# Patient Record
Sex: MALE | Race: WHITE | URBAN - METROPOLITAN AREA
[De-identification: names, ages, dates, MRNs, and addresses within clinical notes are randomized per-mention and may not be internally consistent; named-entity substitution may affect disease eponyms.]

---

## 2019-09-17 ENCOUNTER — TRANSFERRED RECORDS (OUTPATIENT)
Dept: HEALTH INFORMATION MANAGEMENT | Facility: CLINIC | Age: 49
End: 2019-09-17

## 2020-06-23 ENCOUNTER — TRANSFERRED RECORDS (OUTPATIENT)
Dept: HEALTH INFORMATION MANAGEMENT | Facility: CLINIC | Age: 50
End: 2020-06-23

## 2020-07-07 ENCOUNTER — TRANSFERRED RECORDS (OUTPATIENT)
Dept: HEALTH INFORMATION MANAGEMENT | Facility: CLINIC | Age: 50
End: 2020-07-07

## 2020-11-24 ENCOUNTER — TRANSFERRED RECORDS (OUTPATIENT)
Dept: HEALTH INFORMATION MANAGEMENT | Facility: CLINIC | Age: 50
End: 2020-11-24

## 2020-11-24 LAB — PHQ9 SCORE: 0

## 2021-01-07 ENCOUNTER — TRANSFERRED RECORDS (OUTPATIENT)
Dept: HEALTH INFORMATION MANAGEMENT | Facility: CLINIC | Age: 51
End: 2021-01-07

## 2021-01-19 ENCOUNTER — TRANSFERRED RECORDS (OUTPATIENT)
Dept: HEALTH INFORMATION MANAGEMENT | Facility: CLINIC | Age: 51
End: 2021-01-19

## 2021-09-01 ENCOUNTER — TRANSFERRED RECORDS (OUTPATIENT)
Dept: HEALTH INFORMATION MANAGEMENT | Facility: CLINIC | Age: 51
End: 2021-09-01

## 2021-09-01 ENCOUNTER — MEDICAL CORRESPONDENCE (OUTPATIENT)
Dept: HEALTH INFORMATION MANAGEMENT | Facility: CLINIC | Age: 51
End: 2021-09-01

## 2021-10-06 ENCOUNTER — TRANSFERRED RECORDS (OUTPATIENT)
Dept: HEALTH INFORMATION MANAGEMENT | Facility: CLINIC | Age: 51
End: 2021-10-06

## 2021-10-06 LAB — INR (EXTERNAL): 2.2 (ref 2–3)

## 2021-10-20 ENCOUNTER — TRANSFERRED RECORDS (OUTPATIENT)
Dept: HEALTH INFORMATION MANAGEMENT | Facility: CLINIC | Age: 51
End: 2021-10-20

## 2021-10-29 ENCOUNTER — TRANSFERRED RECORDS (OUTPATIENT)
Dept: HEALTH INFORMATION MANAGEMENT | Facility: CLINIC | Age: 51
End: 2021-10-29

## 2021-11-16 LAB
CREATININE (EXTERNAL): 1.1 MG/DL (ref 0.7–1.3)
GLUCOSE (EXTERNAL): 98 MG/DL (ref 70–99)
INR (EXTERNAL): 3.4
POTASSIUM (EXTERNAL): 4.6 MMOL/L (ref 3.5–5.1)

## 2021-12-07 ENCOUNTER — TRANSFERRED RECORDS (OUTPATIENT)
Dept: HEALTH INFORMATION MANAGEMENT | Facility: CLINIC | Age: 51
End: 2021-12-07

## 2021-12-07 LAB — INR (EXTERNAL): 1.6

## 2021-12-21 LAB
INR (EXTERNAL): 1.6 (ref 2–3)
INR (EXTERNAL): 1.7 (ref 2–3)

## 2022-02-24 LAB
INR (EXTERNAL): 1.4 (ref 2–3)
INR (EXTERNAL): 1.5 (ref 2–3)
INR (EXTERNAL): 2.6 (ref 2–3)
INR (EXTERNAL): 6 (ref 2–3)

## 2022-04-28 LAB — INR (EXTERNAL): 1.4 (ref 2–3)

## 2022-05-05 ENCOUNTER — TRANSFERRED RECORDS (OUTPATIENT)
Dept: HEALTH INFORMATION MANAGEMENT | Facility: CLINIC | Age: 52
End: 2022-05-05

## 2022-05-05 LAB — INR (EXTERNAL): 1.9 (ref 2–3)

## 2022-06-06 ENCOUNTER — TRANSFERRED RECORDS (OUTPATIENT)
Dept: HEALTH INFORMATION MANAGEMENT | Facility: CLINIC | Age: 52
End: 2022-06-06

## 2022-07-20 ENCOUNTER — TRANSFERRED RECORDS (OUTPATIENT)
Dept: HEALTH INFORMATION MANAGEMENT | Facility: CLINIC | Age: 52
End: 2022-07-20

## 2022-08-11 ENCOUNTER — TRANSFERRED RECORDS (OUTPATIENT)
Dept: HEALTH INFORMATION MANAGEMENT | Facility: CLINIC | Age: 52
End: 2022-08-11

## 2022-08-11 LAB — INR (EXTERNAL): 1.3 (ref 2–3)

## 2022-08-25 LAB — INR (EXTERNAL): 2.6 (ref 2–3)

## 2022-09-22 LAB
CREATININE (EXTERNAL): 1.3 MG/DL (ref 0.7–1.3)
GLUCOSE (EXTERNAL): 98 MG/DL (ref 70–99)
HBA1C MFR BLD: 5.9 % (ref 4–5.6)
INR (EXTERNAL): 2.7 (ref 2–3)
POTASSIUM (EXTERNAL): 4 MMOL/L (ref 3.5–5.1)

## 2022-09-28 ENCOUNTER — TRANSFERRED RECORDS (OUTPATIENT)
Dept: HEALTH INFORMATION MANAGEMENT | Facility: CLINIC | Age: 52
End: 2022-09-28

## 2022-12-19 ENCOUNTER — TRANSFERRED RECORDS (OUTPATIENT)
Dept: HEALTH INFORMATION MANAGEMENT | Facility: CLINIC | Age: 52
End: 2022-12-19

## 2023-01-16 ENCOUNTER — TRANSFERRED RECORDS (OUTPATIENT)
Dept: HEALTH INFORMATION MANAGEMENT | Facility: CLINIC | Age: 53
End: 2023-01-16

## 2023-03-02 LAB
CREATININE (EXTERNAL): 1.2 MG/DL (ref 0.7–1.3)
GLUCOSE (EXTERNAL): 92 MG/DL (ref 70–99)
INR (EXTERNAL): 2.6
POTASSIUM (EXTERNAL): 4.6 MMOL/L (ref 3.5–5.1)

## 2023-04-28 ENCOUNTER — TRANSFERRED RECORDS (OUTPATIENT)
Dept: HEALTH INFORMATION MANAGEMENT | Facility: CLINIC | Age: 53
End: 2023-04-28

## 2023-04-28 LAB
ALT SERPL-CCNC: 15 U/L (ref 4–33)
AST SERPL-CCNC: 16 U/L (ref 13–39)

## 2023-05-10 ENCOUNTER — TRANSFERRED RECORDS (OUTPATIENT)
Dept: HEALTH INFORMATION MANAGEMENT | Facility: CLINIC | Age: 53
End: 2023-05-10

## 2023-05-17 ENCOUNTER — TELEPHONE (OUTPATIENT)
Dept: TRANSPLANT | Facility: CLINIC | Age: 53
End: 2023-05-17
Payer: MEDICARE

## 2023-05-17 NOTE — TELEPHONE ENCOUNTER
Orin Horn, Janel, RN  Replies will be sent to  [facility]  Good afternoon Janel,     The patient's nurse  called stating that the patient would like to re-establish care with the kidney/pancreas provider. If a call is needed, please call the patient. Checked phone number.     Thank you,   Orin     OUTCOME: spoke with patient who reports that his local provider is [place] is retiring.  He was asked to re establish care here at [facility].  It was discussed that in order for [facility]OT team to manage and prescribe his IS that he would be required to either come to [place] annually for an in person visit or drive to [place] to conduct a virtual visit as we do not have any tx nephrologists licensed in the state of [place].    Patient states he will discuss this further with current provider and return call to [facility] if he would like to proceed with re establishment of care.    Janel Rutledge RN   Transplant Coordinator  [phone]

## 2023-05-25 ENCOUNTER — TRANSFERRED RECORDS (OUTPATIENT)
Dept: HEALTH INFORMATION MANAGEMENT | Facility: CLINIC | Age: 53
End: 2023-05-25

## 2023-05-31 LAB
ALBUMIN (URINE) MG/SPEC: <0.7 MG/DL
CREATININE (URINE): 48.4 MG/DL

## 2023-06-08 ENCOUNTER — TRANSFERRED RECORDS (OUTPATIENT)
Dept: HEALTH INFORMATION MANAGEMENT | Facility: CLINIC | Age: 53
End: 2023-06-08

## 2023-06-12 ENCOUNTER — TRANSFERRED RECORDS (OUTPATIENT)
Dept: HEALTH INFORMATION MANAGEMENT | Facility: CLINIC | Age: 53
End: 2023-06-12

## 2023-06-12 LAB
CREATININE (EXTERNAL): 1 MG/DL (ref 0.7–1.3)
GFR ESTIMATED (EXTERNAL): 91 ML/MIN (ref 60–130)
GLUCOSE (EXTERNAL): 105 MG/DL (ref 70–99)
INR (EXTERNAL): 1.7
POTASSIUM (EXTERNAL): 4 MMOL/L (ref 3.5–5.1)

## 2023-12-04 ENCOUNTER — TRANSFERRED RECORDS (OUTPATIENT)
Dept: HEALTH INFORMATION MANAGEMENT | Facility: CLINIC | Age: 53
End: 2023-12-04

## 2024-04-05 ENCOUNTER — TRANSFERRED RECORDS (OUTPATIENT)
Dept: HEALTH INFORMATION MANAGEMENT | Facility: CLINIC | Age: 54
End: 2024-04-05

## 2024-06-03 ENCOUNTER — TRANSFERRED RECORDS (OUTPATIENT)
Dept: HEALTH INFORMATION MANAGEMENT | Facility: CLINIC | Age: 54
End: 2024-06-03

## 2024-06-25 ENCOUNTER — TRANSFERRED RECORDS (OUTPATIENT)
Dept: HEALTH INFORMATION MANAGEMENT | Facility: CLINIC | Age: 54
End: 2024-06-25